# Patient Record
Sex: FEMALE | Race: WHITE | NOT HISPANIC OR LATINO
[De-identification: names, ages, dates, MRNs, and addresses within clinical notes are randomized per-mention and may not be internally consistent; named-entity substitution may affect disease eponyms.]

---
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
## 2020-02-25 NOTE — ED PROVIDER NOTE - DISCHARGE DATE
[FreeTextEntry1] : chest xray\par if neg then do CT chest\par trial allergy meds since pt already had levaquin\par trial ipratropium bromide for post nasal drip\par  25-Aug-2017

## 2020-06-22 ENCOUNTER — TRANSCRIPTION ENCOUNTER (OUTPATIENT)
Age: 20
End: 2020-06-22

## 2020-08-27 ENCOUNTER — RESULT REVIEW (OUTPATIENT)
Age: 20
End: 2020-08-27

## 2022-10-14 ENCOUNTER — NON-APPOINTMENT (OUTPATIENT)
Age: 22
End: 2022-10-14

## 2023-02-08 ENCOUNTER — NON-APPOINTMENT (OUTPATIENT)
Age: 23
End: 2023-02-08

## 2023-12-06 NOTE — ED ADULT NURSE NOTE - CHIEF COMPLAINT QUOTE
Show Aperture Variable?: Yes c.o fever and sore throat since yesterday morning. fever 103 at 3pm. took 2000mg tylenol in the past 6 hours. temp 102.7 in triage. no pmh Render Post-Care Instructions In Note?: no Post-Care Instructions: I reviewed with the patient in detail post-care instructions. Patient is to wear sunprotection, and avoid picking at any of the treated lesions. Pt may apply Vaseline or Polysporin to crusted or scabbing areas. Consent: The patient's consent was obtained including but not limited to risks of crusting, scabbing, blistering, scarring, darker or lighter pigmentary change, recurrence, incomplete removal and infection. Detail Level: Detailed

## 2024-12-06 NOTE — ED ADULT NURSE NOTE - NS PRO PASSIVE SMOKE EXP
Patient to ED for epigastric pain, chest pain, and nausea since 5 AM. Reports going to sleep feeling fine but waking up this morning in pain. Reports pain when taking a deep breath in.    no Unknown